# Patient Record
Sex: MALE | Race: WHITE | NOT HISPANIC OR LATINO | URBAN - NONMETROPOLITAN AREA
[De-identification: names, ages, dates, MRNs, and addresses within clinical notes are randomized per-mention and may not be internally consistent; named-entity substitution may affect disease eponyms.]

---

## 2017-01-31 PROBLEM — H52.13: Noted: 2017-01-31

## 2017-01-31 PROBLEM — H52.4: Noted: 2017-01-31

## 2017-01-31 PROBLEM — H25.13: Noted: 2017-01-31

## 2020-06-05 ENCOUNTER — IMPORTED ENCOUNTER (OUTPATIENT)
Dept: URBAN - NONMETROPOLITAN AREA CLINIC 1 | Facility: CLINIC | Age: 55
End: 2020-06-05

## 2020-06-05 PROCEDURE — 92310 CONTACT LENS FITTING OU: CPT

## 2020-06-05 PROCEDURE — 92014 COMPRE OPH EXAM EST PT 1/>: CPT

## 2020-06-05 PROCEDURE — V2520 CONTACT LENS HYDROPHILIC: HCPCS

## 2020-06-05 PROCEDURE — 92015 DETERMINE REFRACTIVE STATE: CPT

## 2020-06-05 NOTE — PATIENT DISCUSSION
Myopia / Presbyopia OU- Discussed diagnosis in detail with patient- New glasses and contact lens Rx given today- Optos done previously stable normal findings- Continue to monitor- RTC 1 year completeNSC OU- Discussed diagnosis in detail with patient- Discussed signs and symptoms of progression- Discussed UV protection- No treatment needed at this time - Continue to monitor; 's Notes: MR 6/5/20Optos 4/27/18

## 2021-07-16 ENCOUNTER — IMPORTED ENCOUNTER (OUTPATIENT)
Dept: URBAN - NONMETROPOLITAN AREA CLINIC 1 | Facility: CLINIC | Age: 56
End: 2021-07-16

## 2021-07-16 PROBLEM — H25.13: Noted: 2021-07-16

## 2021-07-16 PROBLEM — H52.4: Noted: 2021-07-16

## 2021-07-16 PROBLEM — S05.01XA: Noted: 2021-07-16

## 2021-07-16 PROBLEM — H52.13: Noted: 2021-07-16

## 2021-07-16 PROCEDURE — 99213 OFFICE O/P EST LOW 20 MIN: CPT

## 2021-07-16 PROCEDURE — 92071 CONTACT LENS FITTING FOR TX: CPT

## 2021-07-16 NOTE — PATIENT DISCUSSION
Corneal abrasions OD - Discussed diagnosis in detail with patient - Central abrasion 3 x 3 small abrasion superior 1x 1 small defect at 2:00 perip- Patient has been using Tobradex recommend D/C- Advised to STOP sleeping in CLS - BCL inserted today OD Dailis Total 1 -0.50 / 8.5 / LOT [id number]/ Exp date 11/2020- Start Polytrim TID OD RX sent to pharmacy - Start Ciprofloxacin every 2 hours while awake RX sent to [address] a drop of Cyclo in OD today - D/C CLS - Continue to monitor - RTC on Monday for F/U PREVIOUS NOTES Myopia / Presbyopia OU- Discussed diagnosis in detail with patient- New glasses and contact lens Rx given today- Optos done previously stable normal findings- Continue to monitor- RTC 1 year completeNSC OU- Discussed diagnosis in detail with patient- Discussed signs and symptoms of progression- Discussed UV protection- No treatment needed at this time - Continue to monitor; Dr's Notes: MR 6/5/20Optos 4/27/18

## 2021-07-19 ENCOUNTER — IMPORTED ENCOUNTER (OUTPATIENT)
Dept: URBAN - NONMETROPOLITAN AREA CLINIC 1 | Facility: CLINIC | Age: 56
End: 2021-07-19

## 2021-07-19 PROCEDURE — 99213 OFFICE O/P EST LOW 20 MIN: CPT

## 2021-07-19 NOTE — PATIENT DISCUSSION
Corneal abrasions OD Resolved - Discussed diagnosis in detail with patient - Central abrasion 3 x 3 small abrasion superior 1x 1 small defect at 2:00 perip resolved today - BCL removed VA after removal of CLS 20/30 OU- Continue Polytrim BID OD x 1 week - D/C Ciprofloxacin  - STRESSED to patient that he may wear CLS for a few hours a day but then needs to take them out and he still has some healing to do. STRESSED NO sleeping in lenses.  If not any better by Friday patientis to return - Continue to monitor  PREVIOUS NOTES Myopia / Presbyopia OU- Discussed diagnosis in detail with patient- New glasses and contact lens Rx given today- Optos done previously stable normal findings- Continue to monitor- RTC 1 year completeNSC OU- Discussed diagnosis in detail with patient- Discussed signs and symptoms of progression- Discussed UV protection- No treatment needed at this time - Continue to monitor; 's Notes: MR 6/5/20Optos 4/27/18

## 2021-09-08 ENCOUNTER — IMPORTED ENCOUNTER (OUTPATIENT)
Dept: URBAN - NONMETROPOLITAN AREA CLINIC 1 | Facility: CLINIC | Age: 56
End: 2021-09-08

## 2021-09-08 PROBLEM — H25.813: Noted: 2021-09-08

## 2021-09-08 PROBLEM — H52.4: Noted: 2021-09-08

## 2021-09-08 PROCEDURE — 99213 OFFICE O/P EST LOW 20 MIN: CPT

## 2021-09-08 PROCEDURE — V2520 CONTACT LENS HYDROPHILIC: HCPCS

## 2021-09-08 PROCEDURE — 92015 DETERMINE REFRACTIVE STATE: CPT

## 2021-09-08 PROCEDURE — 92310 CONTACT LENS FITTING OU: CPT

## 2021-09-08 NOTE — PATIENT DISCUSSION
Myopia / Presbyopia OU- Discussed diagnosis in detail with patient- New glasses and contact lens Rx given today- Optos done previously stable normal findings- Continue to monitor- RTC 1 year completeCataracts OU- Discussed diagnosis in detail with patient- Discussed signs and symptoms of progression- Discussed UV protection- No treatment needed at this time - Continue to monitor; 's Notes: MR 6/5/20Optos 4/27/18

## 2022-04-09 ASSESSMENT — TONOMETRY
OS_IOP_MMHG: 14
OS_IOP_MMHG: 14
OD_IOP_MMHG: 16
OD_IOP_MMHG: 14

## 2022-04-09 ASSESSMENT — VISUAL ACUITY
OD_CC: CF4'
OS_SC: 20/20
OS_SC: 20/20-
OD_PH: 20/40
OS_CC: 20/20
OD_SC: 20/20-
OD_SC: 20/25-2
OS_SC: 20/20

## 2023-10-26 ENCOUNTER — ESTABLISHED PATIENT (OUTPATIENT)
Dept: URBAN - NONMETROPOLITAN AREA CLINIC 1 | Facility: CLINIC | Age: 58
End: 2023-10-26

## 2023-10-26 DIAGNOSIS — H25.813: ICD-10-CM

## 2023-10-26 DIAGNOSIS — H52.4: ICD-10-CM

## 2023-10-26 PROCEDURE — 92310-E CONTACT LENS FITTING ESTABLISH PATIENT

## 2023-10-26 PROCEDURE — S0621 ROUTINE OPHTHALMOLOGICAL EXA: HCPCS

## 2023-10-26 ASSESSMENT — VISUAL ACUITY
OS_CC: 20/22-2
OD_CC: 20/20
OU_CC: 20/20

## 2023-10-26 ASSESSMENT — TONOMETRY
OS_IOP_MMHG: 16
OD_IOP_MMHG: 16

## 2024-10-31 ENCOUNTER — COMPREHENSIVE EXAM (OUTPATIENT)
Dept: URBAN - NONMETROPOLITAN AREA CLINIC 1 | Facility: CLINIC | Age: 59
End: 2024-10-31

## 2024-10-31 DIAGNOSIS — H52.4: ICD-10-CM

## 2024-10-31 DIAGNOSIS — H25.813: ICD-10-CM

## 2024-10-31 PROCEDURE — 99213 OFFICE O/P EST LOW 20 MIN: CPT

## 2024-10-31 PROCEDURE — 92015 DETERMINE REFRACTIVE STATE: CPT

## 2024-10-31 PROCEDURE — 92310-1 LEVEL 1 SOFT LENS UPDATE
